# Patient Record
Sex: MALE | Race: WHITE | URBAN - METROPOLITAN AREA
[De-identification: names, ages, dates, MRNs, and addresses within clinical notes are randomized per-mention and may not be internally consistent; named-entity substitution may affect disease eponyms.]

---

## 2022-09-13 ENCOUNTER — OFFICE VISIT (OUTPATIENT)
Dept: URBAN - METROPOLITAN AREA CLINIC 71 | Facility: CLINIC | Age: 72
End: 2022-09-13
Payer: MEDICARE

## 2022-09-13 DIAGNOSIS — H04.123 DRY EYE SYNDROME OF BILATERAL LACRIMAL GLANDS: ICD-10-CM

## 2022-09-13 DIAGNOSIS — H25.813 COMBINED FORMS OF AGE-RELATED CATARACT, BILATERAL: Primary | ICD-10-CM

## 2022-09-13 DIAGNOSIS — H52.4 PRESBYOPIA: ICD-10-CM

## 2022-09-13 PROCEDURE — 99204 OFFICE O/P NEW MOD 45 MIN: CPT

## 2022-09-13 ASSESSMENT — VISUAL ACUITY
OD: 20/20
OS: 20/25

## 2022-09-13 ASSESSMENT — INTRAOCULAR PRESSURE
OD: 8
OS: 8

## 2023-04-04 ENCOUNTER — OFFICE VISIT (OUTPATIENT)
Dept: URBAN - METROPOLITAN AREA CLINIC 71 | Facility: CLINIC | Age: 73
End: 2023-04-04
Payer: COMMERCIAL

## 2023-04-04 DIAGNOSIS — H25.813 COMBINED FORMS OF AGE-RELATED CATARACT, BILATERAL: ICD-10-CM

## 2023-04-04 DIAGNOSIS — H52.4 PRESBYOPIA: Primary | ICD-10-CM

## 2023-04-04 DIAGNOSIS — H04.123 DRY EYE SYNDROME OF BILATERAL LACRIMAL GLANDS: ICD-10-CM

## 2023-04-04 PROCEDURE — 92012 INTRM OPH EXAM EST PATIENT: CPT

## 2023-04-04 ASSESSMENT — INTRAOCULAR PRESSURE
OS: 9
OD: 9

## 2023-04-04 ASSESSMENT — VISUAL ACUITY
OS: 20/20
OD: 20/20

## 2023-04-04 ASSESSMENT — KERATOMETRY
OD: 43.63
OS: 44.00

## 2023-04-04 NOTE — IMPRESSION/PLAN
Impression: Combined forms of age-related cataract, bilateral: H25.813. Plan: Continue to monitor. Pt to call if vision changes become problematic.

## 2023-04-04 NOTE — IMPRESSION/PLAN
Impression: Dry eye syndrome of bilateral lacrimal glands: H04.123. Plan: Reviewed dry eye, and the effects it can have on the eyes and VA, along with treatment options. Still recommend OTC artificial tear use 2-4x daily w/ emphasis on QAM and QHS doses. Call if symptoms do not improve or if they worsen.

## 2023-07-21 ENCOUNTER — OFFICE VISIT (OUTPATIENT)
Dept: URBAN - METROPOLITAN AREA CLINIC 72 | Facility: CLINIC | Age: 73
End: 2023-07-21
Payer: MEDICARE

## 2023-07-21 DIAGNOSIS — H43.812 VITREOUS DEGENERATION, LEFT EYE: Primary | ICD-10-CM

## 2023-07-21 DIAGNOSIS — H25.813 COMBINED FORMS OF AGE-RELATED CATARACT, BILATERAL: ICD-10-CM

## 2023-07-21 PROCEDURE — 99213 OFFICE O/P EST LOW 20 MIN: CPT | Performed by: OPTOMETRIST

## 2023-07-21 ASSESSMENT — INTRAOCULAR PRESSURE
OS: 9
OD: 9

## 2023-07-21 NOTE — IMPRESSION/PLAN
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 Plan: Discussed signs and symptoms of retinal tear/detachment. Pt to RTC PRN with any change to visual status. R/C 4-6 weeks for DFE to ensure no changes. Explained to pt that floater should get better on its own, usually in 4-6 wks, but can take longer for others. Recommend pt to consider where they are at today as their is new norm until it gets better again.  Reexplained to pt best course of action is to try and ignore the floaters as much as possible to get the brain to adjust to the floater

## 2023-09-01 ENCOUNTER — OFFICE VISIT (OUTPATIENT)
Dept: URBAN - METROPOLITAN AREA CLINIC 71 | Facility: CLINIC | Age: 73
End: 2023-09-01
Payer: MEDICARE

## 2023-09-01 DIAGNOSIS — H43.812 VITREOUS DEGENERATION, LEFT EYE: Primary | ICD-10-CM

## 2023-09-01 DIAGNOSIS — H25.813 COMBINED FORMS OF AGE-RELATED CATARACT, BILATERAL: ICD-10-CM

## 2023-09-01 PROCEDURE — 92134 CPTRZ OPH DX IMG PST SGM RTA: CPT

## 2023-09-01 PROCEDURE — 99213 OFFICE O/P EST LOW 20 MIN: CPT

## 2023-09-01 PROCEDURE — 92133 CPTRZD OPH DX IMG PST SGM ON: CPT

## 2023-09-01 ASSESSMENT — INTRAOCULAR PRESSURE
OD: 8
OS: 8

## 2023-11-18 ENCOUNTER — Encounter (OUTPATIENT)
Dept: URBAN - METROPOLITAN AREA CLINIC 71 | Facility: CLINIC | Age: 73
End: 2023-11-18
Payer: MEDICARE

## 2023-11-18 DIAGNOSIS — H43.823 VITREOMACULAR ADHESION, BILATERAL: ICD-10-CM

## 2023-11-18 PROCEDURE — 92134 CPTRZ OPH DX IMG PST SGM RTA: CPT

## 2023-11-18 PROCEDURE — 99213 OFFICE O/P EST LOW 20 MIN: CPT

## 2023-12-28 ENCOUNTER — OFFICE VISIT (OUTPATIENT)
Dept: URBAN - METROPOLITAN AREA CLINIC 71 | Facility: CLINIC | Age: 73
End: 2023-12-28
Payer: MEDICARE

## 2023-12-28 DIAGNOSIS — H25.813 COMBINED FORMS OF AGE-RELATED CATARACT, BILATERAL: Primary | ICD-10-CM

## 2023-12-28 PROCEDURE — 92136 OPHTHALMIC BIOMETRY: CPT | Performed by: OPHTHALMOLOGY

## 2024-01-23 ENCOUNTER — OFFICE VISIT (OUTPATIENT)
Dept: URBAN - METROPOLITAN AREA CLINIC 71 | Facility: CLINIC | Age: 74
End: 2024-01-23
Payer: MEDICARE

## 2024-01-23 DIAGNOSIS — H43.813 VITREOUS DEGENERATION, BILATERAL: ICD-10-CM

## 2024-01-23 PROCEDURE — 92133 CPTRZD OPH DX IMG PST SGM ON: CPT | Performed by: OPHTHALMOLOGY

## 2024-01-23 PROCEDURE — 99214 OFFICE O/P EST MOD 30 MIN: CPT | Performed by: OPHTHALMOLOGY

## 2024-01-23 PROCEDURE — 92134 CPTRZ OPH DX IMG PST SGM RTA: CPT | Performed by: OPHTHALMOLOGY

## 2024-01-23 RX ORDER — KETOROLAC TROMETHAMINE 5 MG/ML
0.5 % SOLUTION OPHTHALMIC
Qty: 5 | Refills: 1 | Status: ACTIVE
Start: 2024-01-23

## 2024-01-23 ASSESSMENT — INTRAOCULAR PRESSURE
OD: 9
OS: 8

## 2024-01-23 ASSESSMENT — VISUAL ACUITY
OS: 20/20
OD: 20/20

## 2024-02-26 ENCOUNTER — SURGERY (OUTPATIENT)
Dept: URBAN - METROPOLITAN AREA SURGERY 44 | Facility: SURGERY | Age: 74
End: 2024-02-26
Payer: MEDICARE

## 2024-02-26 ENCOUNTER — SURGERY (OUTPATIENT)
Dept: URBAN - METROPOLITAN AREA SURGERY 45 | Facility: SURGERY | Age: 74
End: 2024-02-26
Payer: MEDICARE

## 2024-02-26 PROCEDURE — PR1CP PR1CP: CUSTOM | Performed by: OPHTHALMOLOGY

## 2024-02-26 PROCEDURE — 66984 XCAPSL CTRC RMVL W/O ECP: CPT | Performed by: OPHTHALMOLOGY

## 2024-02-27 ENCOUNTER — POST-OPERATIVE VISIT (OUTPATIENT)
Dept: URBAN - METROPOLITAN AREA CLINIC 71 | Facility: CLINIC | Age: 74
End: 2024-02-27
Payer: MEDICARE

## 2024-02-27 DIAGNOSIS — Z48.810 ENCOUNTER FOR SURGICAL AFTERCARE FOLLOWING SURGERY ON A SENSE ORGAN: Primary | ICD-10-CM

## 2024-02-27 PROCEDURE — 99024 POSTOP FOLLOW-UP VISIT: CPT

## 2024-02-27 ASSESSMENT — INTRAOCULAR PRESSURE
OD: 12
OS: 11

## 2024-03-11 ENCOUNTER — SURGERY (OUTPATIENT)
Dept: URBAN - METROPOLITAN AREA SURGERY 44 | Facility: SURGERY | Age: 74
End: 2024-03-11
Payer: MEDICARE

## 2024-03-11 ENCOUNTER — SURGERY (OUTPATIENT)
Dept: URBAN - METROPOLITAN AREA SURGERY 45 | Facility: SURGERY | Age: 74
End: 2024-03-11
Payer: MEDICARE

## 2024-03-11 PROCEDURE — 66984 XCAPSL CTRC RMVL W/O ECP: CPT | Performed by: OPHTHALMOLOGY

## 2024-03-11 PROCEDURE — PR1CP PR1CP: CUSTOM | Performed by: OPHTHALMOLOGY

## 2024-03-12 ENCOUNTER — POST-OPERATIVE VISIT (OUTPATIENT)
Dept: URBAN - METROPOLITAN AREA CLINIC 71 | Facility: CLINIC | Age: 74
End: 2024-03-12
Payer: MEDICARE

## 2024-03-12 DIAGNOSIS — Z96.1 PRESENCE OF INTRAOCULAR LENS: Primary | ICD-10-CM

## 2024-03-12 PROCEDURE — 99024 POSTOP FOLLOW-UP VISIT: CPT

## 2024-03-12 ASSESSMENT — INTRAOCULAR PRESSURE
OS: 10
OD: 8

## 2024-04-09 ENCOUNTER — OFFICE VISIT (OUTPATIENT)
Dept: URBAN - METROPOLITAN AREA CLINIC 71 | Facility: CLINIC | Age: 74
End: 2024-04-09
Payer: COMMERCIAL

## 2024-04-09 DIAGNOSIS — H52.4 PRESBYOPIA: Primary | ICD-10-CM

## 2024-04-09 PROCEDURE — 92012 INTRM OPH EXAM EST PATIENT: CPT

## 2024-04-09 ASSESSMENT — VISUAL ACUITY
OD: 20/20
OS: 20/20

## 2024-04-09 ASSESSMENT — INTRAOCULAR PRESSURE
OS: 8
OD: 8